# Patient Record
Sex: FEMALE | Race: AMERICAN INDIAN OR ALASKA NATIVE
[De-identification: names, ages, dates, MRNs, and addresses within clinical notes are randomized per-mention and may not be internally consistent; named-entity substitution may affect disease eponyms.]

---

## 2017-05-15 ENCOUNTER — HOSPITAL ENCOUNTER (OUTPATIENT)
Dept: HOSPITAL 5 - MAMMO | Age: 72
Discharge: HOME | End: 2017-05-15
Attending: INTERNAL MEDICINE
Payer: MEDICARE

## 2017-05-15 DIAGNOSIS — N64.4: Primary | ICD-10-CM

## 2017-05-15 PROCEDURE — 77066 DX MAMMO INCL CAD BI: CPT

## 2017-05-15 PROCEDURE — G0204 DX MAMMO INCL CAD BI: HCPCS

## 2017-05-15 NOTE — MAMMOGRAPHY REPORT
Bilateral diagnostic mammogram with spot compression magnification of 

density right lower breast: Compared to 1/28/16. CAD study utilized.



History: Right breast pain.



Findings:



Predominance of adipose tissue bilaterally. No microcalcification. 

Benign axillary nodes. New density seen in the right lower breast 

effaces on spot compression views and 90degree lateral view.



Impression:



Benign findings. Annual followup recommended. BI-RADS CATEGORY:  2 = 

Benign



ACR BI-RADS MAMMOGRAPHIC CODES:

0 = Needs additional imaging evaluation; 1 = Negative; 2 = Benign; 3 = 

Probably benign; 4 = Suspicious; 5 = Malignant; 6 = Known biopsy-proven 

malignancy



COMMENT:

      1.   Dense breast tissue, i.e., adenosis, fibrocystic 

            changes, etc., may obscure an underlying neoplasm.

      2.   Approximately 10% of cancers are not detected with

            mammography.

      3.   A negative mammography report should not delay biopsy 

            if a clinically suspicious mass is present. COMMENT:

Patient follow-up letters are generated in [facility].

## 2017-05-19 ENCOUNTER — HOSPITAL ENCOUNTER (OUTPATIENT)
Dept: HOSPITAL 5 - SPVWC | Age: 72
Discharge: HOME | End: 2017-05-19
Attending: INTERNAL MEDICINE
Payer: MEDICARE

## 2017-05-19 DIAGNOSIS — K76.0: Primary | ICD-10-CM

## 2017-05-19 DIAGNOSIS — K82.8: ICD-10-CM

## 2017-05-19 PROCEDURE — 76705 ECHO EXAM OF ABDOMEN: CPT

## 2017-05-19 NOTE — ULTRASOUND REPORT
RIGHT UPPER QUADRANT ABDOMINAL ULTRASOUND: 05/19/17 11:04:00



CLINICAL: Right upper quadrant abdominal pain.





FINDINGS: High-resolution ultrasound demonstrated a normal size liver 

with moderate diffuse increased echogenicity.  No liver mass.  Normal 

hepatic vasculature and inferior vena cava. Normally distended 

gallbladder with no stones.  The gall bladder wall measures 2.0 mm in 

thickness.  Normal intrahepatic and extra hepatic bile ducts.  The 

common bile duct measures 4.0 mm diameter.  Normal pancreatic head and 

body.  The pancreatic tail is obscured by bowel gas.  Normal upper 

abdominal aorta measuring 2.6 cm. The right kidney is normal and  

measures 11.0 x 3.5 x 4.3cm.  No ascites or mass.



IMPRESSION: 1.  Hepatic steatosis.  2.  Normal biliary tract with no 

cholelithiasis.  3.  No signs of pancreatitis.

## 2020-01-13 ENCOUNTER — HOSPITAL ENCOUNTER (OUTPATIENT)
Dept: HOSPITAL 5 - SPVWC | Age: 75
Discharge: HOME | End: 2020-01-13
Attending: INTERNAL MEDICINE
Payer: MEDICARE

## 2020-01-13 DIAGNOSIS — R11.0: ICD-10-CM

## 2020-01-13 DIAGNOSIS — R10.9: Primary | ICD-10-CM

## 2020-01-13 PROCEDURE — 76705 ECHO EXAM OF ABDOMEN: CPT

## 2020-01-13 NOTE — ULTRASOUND REPORT
ABDOMINAL ULTRASOUND LIMITED (RIGHT UPPER QUADRANT)



HISTORY: Abdominal pain



COMPARISON: None.



TECHNIQUE: Multiple real-time ultrasonographic grayscale images were obtained of the right upper abdo
men.



FINDINGS:

Pancreas: No significant abnormality.

Liver: Normal.

Gallbladder: No stones, wall thickening or pericholecystic fluid.

Common bile duct: 4.5 mm.

Right kidney: Normal.  No hydronephrosis.  No renal mass, cyst or calculus. Kidney measures 10.4 cm.



Additional findings: None.



IMPRESSION:

1. Normal study.



2. No cholelithiasis and no signs of acute cholecystitis.



Signer Name: Rasheed Ziegler MD 

Signed: 1/13/2020 10:46 AM

 Workstation Name: [id number]

## 2021-04-15 ENCOUNTER — HOSPITAL ENCOUNTER (OUTPATIENT)
Dept: HOSPITAL 5 - MAMMO | Age: 76
Discharge: HOME | End: 2021-04-15
Attending: INTERNAL MEDICINE
Payer: MEDICARE

## 2021-04-15 DIAGNOSIS — Z12.31: Primary | ICD-10-CM

## 2021-04-15 PROCEDURE — 77067 SCR MAMMO BI INCL CAD: CPT

## 2021-04-15 NOTE — MAMMOGRAPHY REPORT
DIGITAL SCREENING MAMMOGRAM WITH CAD, 4/15/2021



CLINICAL INFORMATION / INDICATION: Routine screening mammography. SCREENING MAMMOGRAM



TECHNIQUE:  Digital bilateral 2D mammography was obtained in the craniocaudal and mediolateral obliqu
e projections. This examination was interpreted with the benefit of Computer-Aided Detection analysis
.



COMPARISON: 5/15/2017 and 1/28/2016



FINDINGS: 



Breast Density: There are scattered areas of fibroglandular density.



No dominant mass, suspicious calcifications, or architectural distortion in either breast. 





 

IMPRESSION: No mammographic evidence of malignancy.



Follow up recommendation: Routine yearly



BI-RADS Category 1:  Negative.





-------------------------------------------------------------------------------------------

A "normal" or negative report should not discourage follow up or biopsy of a clinically significant f
inding.



A written summary of these findings will be mailed to the patient. The patient will be entered into a
 mammography reporting system which will generate a reminder letter for the patient's next appointmen
t at the appropriate interval.



The [employer] recommends yearly mammograms starting at age 40 and continuing as l
estefani as a woman is in good health.  Breast MRI is recommended for women with an approximate 20-25% or 
greater lifetime risk of breast cancer, including women with a strong family history of breast or ova
song cancer or who have been treated for Hodgkin's disease.



Signer Name: Trell Zhang MD 

Signed: 4/15/2021 1:01 PM

Workstation Name: [id number]